# Patient Record
Sex: FEMALE | Race: OTHER
[De-identification: names, ages, dates, MRNs, and addresses within clinical notes are randomized per-mention and may not be internally consistent; named-entity substitution may affect disease eponyms.]

---

## 2017-08-18 ENCOUNTER — HOSPITAL ENCOUNTER (EMERGENCY)
Dept: HOSPITAL 72 - EMR | Age: 47
Discharge: HOME | End: 2017-08-18
Payer: MEDICAID

## 2017-08-18 VITALS — SYSTOLIC BLOOD PRESSURE: 112 MMHG | DIASTOLIC BLOOD PRESSURE: 78 MMHG

## 2017-08-18 VITALS — SYSTOLIC BLOOD PRESSURE: 112 MMHG | DIASTOLIC BLOOD PRESSURE: 75 MMHG

## 2017-08-18 VITALS — WEIGHT: 125 LBS | HEIGHT: 62 IN | BODY MASS INDEX: 23 KG/M2
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
## 2017-08-18 DIAGNOSIS — J45.901: ICD-10-CM

## 2017-08-18 DIAGNOSIS — S46.911A: Primary | ICD-10-CM

## 2017-08-18 PROCEDURE — 94640 AIRWAY INHALATION TREATMENT: CPT

## 2017-08-18 PROCEDURE — 94664 DEMO&/EVAL PT USE INHALER: CPT

## 2017-08-18 PROCEDURE — 99282 EMERGENCY DEPT VISIT SF MDM: CPT

## 2017-08-18 NOTE — EMERGENCY ROOM REPORT
History of Present Illness


General


Chief Complaint:  Dyspnea/Respdistress


Source:  Patient





Present Illness


HPI


47YOF with acute SOB for 1 hour with cough and recent rhinorrhea/URI symptoms


History of asthma


Exacerbations non frequent


Non-smoker


Never intubated


Ran out of home albuterol


Not on steroids


Denies fever/chills, chest pain





On ROS, c/o right shoulder pain after moving furniture at home today.  Sharp 

pain to front of right anterior shoulder. No reduced ROM.


Allergies:  


Coded Allergies:  


     No Known Allergies (Unverified , 5/22/16)





Patient History


Past Medical History:  asthma


Past Surgical History:  none


Pertinent Family History:  none


Social History:  Denies: alcohol use, drug use, smoking


Last Menstrual Period:  last month


Pregnant Now:  No


Immunizations:  UTD


Reviewed Nursing Documentation:  PMH: Agreed, PSxH: Agreed





Nursing Documentation-PMH


Hx Asthma:  Yes





Review of Systems


All Other Systems:  negative except mentioned in HPI





Physical Exam





Vital Signs








  Date Time  Temp Pulse Resp B/P Pulse Ox O2 Delivery O2 Flow Rate FiO2


 


8/18/17 21:33 97.3 100 16 112/75 100 Room Air  








Sp02 EP Interpretation:  reviewed, normal


General Appearance:  normal inspection, well appearing, no apparent distress, 

alert, GCS 15, non-toxic


Head:  normocephalic, atraumatic


Eyes:  bilateral eye EOMI, bilateral eye PERRL


ENT:  normal ENT inspection, hearing grossly normal, normal voice


Neck:  normal inspection, full range of motion, supple, no bony tend


Respiratory:  normal inspection, no respiratory distress, no retraction, no 

accessory muscle use, no wheezing, decreased breath sounds, speaking full 

sentences


Cardiovascular #1:  regular rate, rhythm, no edema


Gastrointestinal:  normal inspection, normal bowel sounds, non tender, soft, no 

guarding, no hernia


Genitourinary:  no CVA tenderness


Musculoskeletal:  normal inspection, back normal, normal range of motion, Radha'

s Sign negative, other - right shoulder: No reduced ROM.  Point ttp anterior 

shoulder.  No deformity or dislcoation


Neurologic:  normal inspection, alert, oriented x3, responsive, CNs III-XII nml 

as tested, motor strength/tone normal, speech normal


Psychiatric:  normal inspection, judgement/insight normal, mood/affect normal


Skin:  normal inspection, normal color, no rash





Medical Decision Making


Diagnostic Impression:  


 Primary Impression:  


 Right shoulder strain


 Qualified Codes:  S46.911A - Strain of unspecified muscle, fascia and tendon 

at shoulder and upper arm level, right arm, initial encounter


 Additional Impression:  


 Asthma exacerbation


ER Course


Mild asthma exacerbation


Improved with albuterol, prednisone


Not tachycardic, tachypnic or hypoxic


Rx Ventolin, prednisone


Well appearing, not systemically ill.


Low suspicion for ACS , PE given history of asthma, exacerbation more likely, 

reduced air movement improved with albuterol, not hypoxic





Right shoulder strain


No dislocation


No direct trauma


Doubt fx


Analgesia provided





Last Vital Signs








  Date Time  Temp Pulse Resp B/P Pulse Ox O2 Delivery O2 Flow Rate FiO2


 


8/18/17 21:33 97.3 100 16 112/75 100 Room Air  








Status:  improved


Disposition:  HOME, SELF-CARE











REMEDIOS FONSECA M.D. Aug 18, 2017 21:52

## 2019-11-21 ENCOUNTER — HOSPITAL ENCOUNTER (EMERGENCY)
Dept: HOSPITAL 72 - EMR | Age: 49
LOS: 1 days | Discharge: HOME | End: 2019-11-22
Payer: MEDICAID

## 2019-11-21 VITALS — DIASTOLIC BLOOD PRESSURE: 84 MMHG | SYSTOLIC BLOOD PRESSURE: 139 MMHG

## 2019-11-21 VITALS — WEIGHT: 145 LBS | HEIGHT: 68 IN | BODY MASS INDEX: 21.98 KG/M2
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
## 2019-11-21 DIAGNOSIS — Z85.05: ICD-10-CM
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
## 2019-11-21 DIAGNOSIS — J45.909: ICD-10-CM

## 2019-11-21 DIAGNOSIS — S61.412A: Primary | ICD-10-CM

## 2019-11-21 PROCEDURE — 12002 RPR S/N/AX/GEN/TRNK2.6-7.5CM: CPT

## 2019-11-21 PROCEDURE — 90471 IMMUNIZATION ADMIN: CPT

## 2019-11-21 PROCEDURE — 99283 EMERGENCY DEPT VISIT LOW MDM: CPT

## 2019-11-21 PROCEDURE — 90715 TDAP VACCINE 7 YRS/> IM: CPT

## 2019-11-21 NOTE — NUR
ED Nurse Note:

PATIENT BROUGHT IN AMBULANCE L[facility]D RA 68 FOR LACERATION ON LEFT HAND. PATIENT 
STATES SHE ACCIDENTALLY CUT HERSELF ON TUNA CAN AND TOOK MORPHINE PO. PATIENT 
AO4. NAD. VSS. WOUND DRESSING NOTED ON HAND. UNDRESSED AND CLEANED WOUND; WOUND 
STARTED BLEEDING. APPLIED PRESSURE AND NOTIFIED ERMD. ERMD AT BEDSIDE FOR 
EVALUATION. WILL CONTINUE TO MONITOR.

## 2019-11-22 VITALS — DIASTOLIC BLOOD PRESSURE: 78 MMHG | SYSTOLIC BLOOD PRESSURE: 114 MMHG

## 2019-11-22 NOTE — NUR
ER DISCHARGE NOTE:

Patient is cleared to be discharged per ERMD, pt is aox4, on room air, with 
stable vital signs. accompanied by family member. pt was given dc and 
prescription instructions, pt was able to verbalize understanding, pt id band 
removed. pt is able to ambulate with steady gait. pt took all belongings.

## 2024-06-21 NOTE — EMERGENCY ROOM REPORT
History of Present Illness


General


Chief Complaint:  Laceration


Source:  Patient





Present Illness


HPI


49-year-old female history of stage II liver cancer presents with laceration to 

the left hand patient cut herself while opening a can, she then fainted was sit 

down on the floor gently, positive LOC, patient may have taken too much 

morphine as well by accident, she denies any chest pain shortness of breath, 

does not remember when her last tetanus was, patient presents for evaluation.  

She endorses sharp left hand pain worsened with movement alleviated with rest 

severity is mild, intermittent


Allergies:  


Coded Allergies:  


     No Known Allergies (Unverified , 18)





Patient History


Past Medical History:  see triage record


Last Menstrual Period:  19


Pregnant Now:  No


:  5


Para:  5


Reviewed Nursing Documentation:  PMH: Agreed; PSxH: Agreed





Nursing Documentation-PMH


Hx Asthma:  Yes


Hx Cancer:  Yes - LIVER





Review of Systems


All Other Systems:  negative except mentioned in HPI





Physical Exam





Vital Signs








  Date Time  Temp Pulse Resp B/P (MAP) Pulse Ox O2 Delivery O2 Flow Rate FiO2


 


19 21:54 98.2 91 16 139/84 (102) 98 Room Air  








General Appearance:  well appearing, no apparent distress


Head:  normocephalic, atraumatic


Eyes:  bilateral eye PERRL, bilateral eye EOMI


ENT:  hearing grossly normal, normal voice


Neck:  full range of motion, supple


Respiratory:  no respiratory distress, speaking full sentences


Musculoskeletal:  other - Left hand: 2+ radial pulse, cap refill less than 2 

seconds, radial median ulnar nerve grossly intact, patient is able to flex her 

entire hand no evidence of tendon injury, laceration measures 5cm


Neurologic:  alert, normal gait


Psychiatric:  mood/affect normal


Skin:  no rash





Procedures


Laceration/Wound Repair


Laceration/Wound Repair :  


   Consent:  Verbal


   Wound Location:  upper extremity


   Wound's Depth, Shape:  superficial


   Wound Length (cm):  5


   Wound Explored:  clean


   Irrigated w/ Saline (ccs):  500


   Betadine Prep?:  Yes


   Anesthesia:  1% Lidocaine


   Volume Anesthetic (ccs):  10


   Wound Repaired With:  sutures


   Suture Size/Type:  5:0


   Number of Sutures:  8


   Patient Tolerated:  Well


   Complications:  None





Medical Decision Making


Diagnostic Impression:  


 Primary Impression:  


 Laceration


ER Course


Patient with laceration to hand


s/p repair


TDAP given


Abx


Follow-up with ortho hand. Neurovasc exam intact, no e/o of tendon injury


Counseled patient to follow-up.





Last Vital Signs








  Date Time  Temp Pulse Resp B/P (MAP) Pulse Ox O2 Delivery O2 Flow Rate FiO2


 


19 22:10 98.2 98 16 139/84 98 Room Air  








Disposition:  HOME, SELF-CARE


Condition:  Stable


Scripts


Cephalexin* (KEFLEX*) 500 Mg Tablet


500 MG ORAL EVERY 6 HOURS, #28 CAP


   Prov: Joseph Maria MD         19


Referrals:  


NON PHYSICIAN (PCP)











[facility]





Orthopedic Urgent Care


Patient Instructions:  Laceration Care, Adult





Additional Instructions:  


The patient was provided with discharge instructions, notified to follow-up 

with a primary care doctor and or specialist in the next 24-48 hours, and to 

return to the ED if they have worsening of their symptoms. 





Please note that this report is being documented using DRAGON technology.


This can lead to erroneous entry secondary to incorrect interpretation by the 

dictating instrument. 





SUTURES CAN BE REMOVED 2019





PLEASE FOLLOW-UP WITH ORTHO Joseph Nielson MD 2019 00:30 How Severe Are Your Spot(S)?: mild What Type Of Note Output Would You Prefer (Optional)?: Bullet Format What Is The Reason For Today's Visit?: Full Body Skin Examination What Is The Reason For Today's Visit? (Being Monitored For X): concerning skin lesions on an annual basis